# Patient Record
Sex: FEMALE
[De-identification: names, ages, dates, MRNs, and addresses within clinical notes are randomized per-mention and may not be internally consistent; named-entity substitution may affect disease eponyms.]

---

## 2021-03-09 PROBLEM — Z00.00 ENCOUNTER FOR PREVENTIVE HEALTH EXAMINATION: Status: ACTIVE | Noted: 2021-03-09

## 2021-03-11 ENCOUNTER — LABORATORY RESULT (OUTPATIENT)
Age: 46
End: 2021-03-11

## 2021-03-11 ENCOUNTER — APPOINTMENT (OUTPATIENT)
Dept: SURGERY | Facility: CLINIC | Age: 46
End: 2021-03-11
Payer: COMMERCIAL

## 2021-03-11 VITALS
SYSTOLIC BLOOD PRESSURE: 155 MMHG | BODY MASS INDEX: 39.26 KG/M2 | HEIGHT: 57 IN | WEIGHT: 182 LBS | DIASTOLIC BLOOD PRESSURE: 96 MMHG | HEART RATE: 94 BPM

## 2021-03-11 DIAGNOSIS — R03.0 ELEVATED BLOOD-PRESSURE READING, W/OUT DIAGNOSIS OF HYPERTENSION: ICD-10-CM

## 2021-03-11 DIAGNOSIS — Z87.891 PERSONAL HISTORY OF NICOTINE DEPENDENCE: ICD-10-CM

## 2021-03-11 PROCEDURE — 99072 ADDL SUPL MATRL&STAF TM PHE: CPT

## 2021-03-11 PROCEDURE — 99203 OFFICE O/P NEW LOW 30 MIN: CPT | Mod: 25

## 2021-03-11 PROCEDURE — 10005 FNA BX W/US GDN 1ST LES: CPT

## 2021-03-11 RX ORDER — GLUC/MSM/COLGN2/HYAL/ANTIARTH3 375-375-20
TABLET ORAL
Refills: 0 | Status: ACTIVE | COMMUNITY

## 2021-03-13 ENCOUNTER — TRANSCRIPTION ENCOUNTER (OUTPATIENT)
Age: 46
End: 2021-03-13

## 2021-03-13 NOTE — ASSESSMENT
[FreeTextEntry1] : Patient with multinodular goiter with large, left nodule with uptake on PET scan. Ultrasound-guided fine-needle aspiration of the left nodule performed. Please see separate procedure note. If benign cytology, follow up ultrasound August 2021, RTO 6 months.

## 2021-03-13 NOTE — HISTORY OF PRESENT ILLNESS
[de-identified] : Patient referred by Dr. Maya for evaluation of multinodular goiter.  Patient is followed by a hematologist for an elevated white count and protein. PET scan February 2021: Left thyroid nodule 4.5 CM, SUV 10.4, right nodule 3.6, SUV 2.2.  Thyroid ultrasound, right lobe 4.8 x 1.7 x 1.7 CM with a 4 mm coarse calcification. Left lobe 5 x 3 x 2.9 CM, with 4 x 2.7 x 2.5 CM nodule with microcalcifications. Left level II lymph node measuring 1.3 x 0.7 x 0.5 CM.  , TSH 1.7, free T4 1.1.  Patient denies prior history of thyroid disease, dysphagia, change in voice, shortness of breath, or radiation exposure.  I have reviewed all old and new data and available images.  Additional information was obtained from others present at the time of the visit to ensure the completeness of the history.\par

## 2021-03-13 NOTE — PROCEDURE
[FreeTextEntry1] : US guided FNA [FreeTextEntry2] : left nodule 4.5 cm with increased SUV on PET scan. [FreeTextEntry3] : Patient for thyroid biopsy. Risks benefits and alternatives discussed including bleeding, infection, swelling and need for repeat biopsy. Questions answered.\par Consent obtained, timeout taken.\par \par Patient supine.\par No anesthetic used. \par Nodule localized with US guidance\par Sterile technique with Betadine skin prep.\par 22-gauge needle under ultrasound guidance with a single pass.\par Slides prepared sent to histology.\par \par Post procedure:\par Hemostasis obtained, patient tolerated well, no complications.\par Post procedure instructions given.\par \par

## 2021-03-13 NOTE — CONSULT LETTER
[Dear  ___] : Dear  [unfilled], [Consult Letter:] : I had the pleasure of evaluating your patient, [unfilled]. [Please see my note below.] : Please see my note below. [Consult Closing:] : Thank you very much for allowing me to participate in the care of this patient.  If you have any questions, please do not hesitate to contact me. [DrChristine  ___] : Dr. MOSES [FreeTextEntry2] : Dr. Malika Maya [FreeTextEntry3] : Sincerely yours,\par \par Meghan Mast MD, FACS\par Assistant Professor of Surgery and Otolaryngology\par [facility]

## 2021-03-13 NOTE — PHYSICAL EXAM
[Normal] : no neck adenopathy [de-identified] : short, thick neck , no cervical or supraclavicular adenopathy, trachea midline, thyroid without enlargement, with left nodule 4 cm extending behind clavicle.  [de-identified] : Skin:  normal appearance.  no rash, nodules, vesicles, or erythema,\par Musculoskeletal:  full range of motion and no deformities appreciated\par Neurological:  grossly intact\par Psychiatric:  oriented to person, place and time with appropriate affect

## 2021-03-13 NOTE — PHYSICAL EXAM
[Normal] : no neck adenopathy [de-identified] : short, thick neck , no cervical or supraclavicular adenopathy, trachea midline, thyroid without enlargement, with left nodule 4 cm extending behind clavicle.  [de-identified] : Skin:  normal appearance.  no rash, nodules, vesicles, or erythema,\par Musculoskeletal:  full range of motion and no deformities appreciated\par Neurological:  grossly intact\par Psychiatric:  oriented to person, place and time with appropriate affect

## 2021-03-13 NOTE — CONSULT LETTER
[Dear  ___] : Dear  [unfilled], [Consult Letter:] : I had the pleasure of evaluating your patient, [unfilled]. [Please see my note below.] : Please see my note below. [Consult Closing:] : Thank you very much for allowing me to participate in the care of this patient.  If you have any questions, please do not hesitate to contact me. [DrChristine  ___] : Dr. MOSES [FreeTextEntry2] : Dr. Malika Maya [FreeTextEntry3] : Sincerely yours,\par \par Mehgan Mast MD, FACS\par Assistant Professor of Surgery and Otolaryngology\par [facility]

## 2021-03-13 NOTE — HISTORY OF PRESENT ILLNESS
[de-identified] : Patient referred by Dr. Maya for evaluation of multinodular goiter.  Patient is followed by a hematologist for an elevated white count and protein. PET scan February 2021: Left thyroid nodule 4.5 CM, SUV 10.4, right nodule 3.6, SUV 2.2.  Thyroid ultrasound, right lobe 4.8 x 1.7 x 1.7 CM with a 4 mm coarse calcification. Left lobe 5 x 3 x 2.9 CM, with 4 x 2.7 x 2.5 CM nodule with microcalcifications. Left level II lymph node measuring 1.3 x 0.7 x 0.5 CM.  , TSH 1.7, free T4 1.1.  Patient denies prior history of thyroid disease, dysphagia, change in voice, shortness of breath, or radiation exposure.  I have reviewed all old and new data and available images.  Additional information was obtained from others present at the time of the visit to ensure the completeness of the history.\par

## 2021-03-13 NOTE — REASON FOR VISIT
[Initial Consultation] : an initial consultation for [Procedure: _________] : a [unfilled] procedure visit [Friend] : friend [FreeTextEntry2] : multinodular goiter

## 2021-03-15 ENCOUNTER — NON-APPOINTMENT (OUTPATIENT)
Age: 46
End: 2021-03-15

## 2021-03-25 ENCOUNTER — NON-APPOINTMENT (OUTPATIENT)
Age: 46
End: 2021-03-25

## 2021-04-14 ENCOUNTER — OUTPATIENT (OUTPATIENT)
Dept: OUTPATIENT SERVICES | Facility: HOSPITAL | Age: 46
LOS: 1 days | End: 2021-04-14

## 2021-04-14 VITALS
WEIGHT: 177.91 LBS | HEART RATE: 98 BPM | SYSTOLIC BLOOD PRESSURE: 138 MMHG | RESPIRATION RATE: 16 BRPM | OXYGEN SATURATION: 98 % | HEIGHT: 57 IN | TEMPERATURE: 98 F | DIASTOLIC BLOOD PRESSURE: 80 MMHG

## 2021-04-14 DIAGNOSIS — Z01.812 ENCOUNTER FOR PREPROCEDURAL LABORATORY EXAMINATION: ICD-10-CM

## 2021-04-14 DIAGNOSIS — E04.1 NONTOXIC SINGLE THYROID NODULE: ICD-10-CM

## 2021-04-14 DIAGNOSIS — Z98.890 OTHER SPECIFIED POSTPROCEDURAL STATES: Chronic | ICD-10-CM

## 2021-04-14 LAB
ANION GAP SERPL CALC-SCNC: 11 MMOL/L — SIGNIFICANT CHANGE UP (ref 7–14)
BUN SERPL-MCNC: 6 MG/DL — LOW (ref 7–23)
CALCIUM SERPL-MCNC: 10.1 MG/DL — SIGNIFICANT CHANGE UP (ref 8.4–10.5)
CHLORIDE SERPL-SCNC: 102 MMOL/L — SIGNIFICANT CHANGE UP (ref 98–107)
CO2 SERPL-SCNC: 26 MMOL/L — SIGNIFICANT CHANGE UP (ref 22–31)
CREAT SERPL-MCNC: 0.64 MG/DL — SIGNIFICANT CHANGE UP (ref 0.5–1.3)
GLUCOSE SERPL-MCNC: 112 MG/DL — HIGH (ref 70–99)
HCG UR QL: NEGATIVE — SIGNIFICANT CHANGE UP
HCT VFR BLD CALC: 39.8 % — SIGNIFICANT CHANGE UP (ref 34.5–45)
HGB BLD-MCNC: 13.4 G/DL — SIGNIFICANT CHANGE UP (ref 11.5–15.5)
MCHC RBC-ENTMCNC: 30.1 PG — SIGNIFICANT CHANGE UP (ref 27–34)
MCHC RBC-ENTMCNC: 33.7 GM/DL — SIGNIFICANT CHANGE UP (ref 32–36)
MCV RBC AUTO: 89.4 FL — SIGNIFICANT CHANGE UP (ref 80–100)
NRBC # BLD: 0 /100 WBCS — SIGNIFICANT CHANGE UP
NRBC # FLD: 0 K/UL — SIGNIFICANT CHANGE UP
PLATELET # BLD AUTO: 297 K/UL — SIGNIFICANT CHANGE UP (ref 150–400)
POTASSIUM SERPL-MCNC: 3.9 MMOL/L — SIGNIFICANT CHANGE UP (ref 3.5–5.3)
POTASSIUM SERPL-SCNC: 3.9 MMOL/L — SIGNIFICANT CHANGE UP (ref 3.5–5.3)
RBC # BLD: 4.45 M/UL — SIGNIFICANT CHANGE UP (ref 3.8–5.2)
RBC # FLD: 12.5 % — SIGNIFICANT CHANGE UP (ref 10.3–14.5)
SODIUM SERPL-SCNC: 139 MMOL/L — SIGNIFICANT CHANGE UP (ref 135–145)
WBC # BLD: 7.38 K/UL — SIGNIFICANT CHANGE UP (ref 3.8–10.5)
WBC # FLD AUTO: 7.38 K/UL — SIGNIFICANT CHANGE UP (ref 3.8–10.5)

## 2021-04-14 RX ORDER — SODIUM CHLORIDE 9 MG/ML
1000 INJECTION, SOLUTION INTRAVENOUS
Refills: 0 | Status: DISCONTINUED | OUTPATIENT
Start: 2021-04-21 | End: 2021-05-05

## 2021-04-14 NOTE — H&P PST ADULT - HISTORY OF PRESENT ILLNESS
46 yo female presents to [facility] for preop evaluation for left thyroid lobectomy with closure possible total.  Patient reports seen by Hematologist and noted to have thyroid nodule on left side.  Patient had diagnostic imaging and diagnosed with nontoxic single thyroid nodule.  Patient denies any pain or difficulty swallowing.

## 2021-04-14 NOTE — H&P PST ADULT - OTHER CARE PROVIDERS
Dr. Maya Endocrinologist [phone], Dr. Costello [facility], Dr. Maya Endocrinologist [phone], Dr. Costello Hematologist [phone]

## 2021-04-14 NOTE — H&P PST ADULT - NSICDXFAMILYHX_GEN_ALL_CORE_FT
FAMILY HISTORY:  Father  Still living? Unknown  FHx: heart disease, Age at diagnosis: Age Unknown

## 2021-04-14 NOTE — H&P PST ADULT - NSANTHOSAYNRD_GEN_A_CORE
No. PARISA screening performed.  STOP BANG Legend: 0-2 = LOW Risk; 3-4 = INTERMEDIATE Risk; 5-8 = HIGH Risk

## 2021-04-14 NOTE — H&P PST ADULT - NSICDXPROBLEM_GEN_ALL_CORE_FT
PROBLEM DIAGNOSES  Problem: Nontoxic single thyroid nodule  Assessment and Plan: Patient scheduled for left thyroid lobectomy with closure possible total on 4/21/2021  Written & verbal preop instructions, gi prophylaxis & surgical soap given  Pt verbalized good understanding.  Teach back done on surgical soap instructions.     Problem: Encounter for preprocedure screening laboratory testing for COVID-19  Assessment and Plan: Patient aware of need for COVID testing prior to procedure and advised to co ordinate with surgeon.

## 2021-04-14 NOTE — H&P PST ADULT - NEGATIVE OPHTHALMOLOGIC SYMPTOMS
no diplopia/no photophobia/no blurred vision L/no blurred vision R/no pain L/no pain R/no irritation L/no irritation R

## 2021-04-17 DIAGNOSIS — Z01.818 ENCOUNTER FOR OTHER PREPROCEDURAL EXAMINATION: ICD-10-CM

## 2021-04-18 ENCOUNTER — APPOINTMENT (OUTPATIENT)
Dept: DISASTER EMERGENCY | Facility: CLINIC | Age: 46
End: 2021-04-18

## 2021-04-18 LAB — SARS-COV-2 N GENE NPH QL NAA+PROBE: NOT DETECTED

## 2021-04-20 ENCOUNTER — TRANSCRIPTION ENCOUNTER (OUTPATIENT)
Age: 46
End: 2021-04-20

## 2021-04-20 NOTE — ASU PATIENT PROFILE, ADULT - PMH
H/O ganglion cyst  left hand  Hyperproteinemia    Iron deficiency anemia    Nontoxic single thyroid nodule    Obesity

## 2021-04-21 ENCOUNTER — OUTPATIENT (OUTPATIENT)
Dept: OUTPATIENT SERVICES | Facility: HOSPITAL | Age: 46
LOS: 1 days | Discharge: HOME | End: 2021-04-21
Payer: COMMERCIAL

## 2021-04-21 ENCOUNTER — APPOINTMENT (OUTPATIENT)
Dept: SURGERY | Facility: HOSPITAL | Age: 46
End: 2021-04-21

## 2021-04-21 ENCOUNTER — RESULT REVIEW (OUTPATIENT)
Age: 46
End: 2021-04-21

## 2021-04-21 VITALS
DIASTOLIC BLOOD PRESSURE: 68 MMHG | HEART RATE: 88 BPM | OXYGEN SATURATION: 98 % | RESPIRATION RATE: 20 BRPM | SYSTOLIC BLOOD PRESSURE: 124 MMHG | TEMPERATURE: 98 F

## 2021-04-21 VITALS
HEART RATE: 89 BPM | TEMPERATURE: 98 F | WEIGHT: 177.91 LBS | OXYGEN SATURATION: 99 % | HEIGHT: 57 IN | DIASTOLIC BLOOD PRESSURE: 79 MMHG | SYSTOLIC BLOOD PRESSURE: 148 MMHG | RESPIRATION RATE: 16 BRPM

## 2021-04-21 DIAGNOSIS — Z98.890 OTHER SPECIFIED POSTPROCEDURAL STATES: Chronic | ICD-10-CM

## 2021-04-21 DIAGNOSIS — E04.1 NONTOXIC SINGLE THYROID NODULE: ICD-10-CM

## 2021-04-21 LAB — HCG UR QL: NEGATIVE — SIGNIFICANT CHANGE UP

## 2021-04-21 PROCEDURE — 88305 TISSUE EXAM BY PATHOLOGIST: CPT | Mod: 26

## 2021-04-21 PROCEDURE — 60271 REMOVAL OF THYROID: CPT | Mod: 59

## 2021-04-21 PROCEDURE — 60500 EXPLORE PARATHYROID GLANDS: CPT

## 2021-04-21 PROCEDURE — 88307 TISSUE EXAM BY PATHOLOGIST: CPT | Mod: 26

## 2021-04-21 PROCEDURE — 88331 PATH CONSLTJ SURG 1 BLK 1SPC: CPT | Mod: 26

## 2021-04-21 PROCEDURE — 13132 CMPLX RPR F/C/C/M/N/AX/G/H/F: CPT | Mod: 59

## 2021-04-21 RX ORDER — LORATADINE 10 MG/1
1 TABLET ORAL
Qty: 0 | Refills: 0 | DISCHARGE

## 2021-04-21 RX ORDER — FENTANYL CITRATE 50 UG/ML
50 INJECTION INTRAVENOUS
Refills: 0 | Status: DISCONTINUED | OUTPATIENT
Start: 2021-04-21 | End: 2021-04-21

## 2021-04-21 RX ORDER — BENZOCAINE AND MENTHOL 5; 1 G/100ML; G/100ML
1 LIQUID ORAL
Refills: 0 | Status: DISCONTINUED | OUTPATIENT
Start: 2021-04-21 | End: 2021-05-05

## 2021-04-21 RX ORDER — ACETAMINOPHEN 500 MG
650 TABLET ORAL EVERY 6 HOURS
Refills: 0 | Status: DISCONTINUED | OUTPATIENT
Start: 2021-04-21 | End: 2021-05-05

## 2021-04-21 RX ORDER — FENTANYL CITRATE 50 UG/ML
25 INJECTION INTRAVENOUS
Refills: 0 | Status: DISCONTINUED | OUTPATIENT
Start: 2021-04-21 | End: 2021-04-21

## 2021-04-21 RX ORDER — ONDANSETRON 8 MG/1
4 TABLET, FILM COATED ORAL ONCE
Refills: 0 | Status: DISCONTINUED | OUTPATIENT
Start: 2021-04-21 | End: 2021-05-05

## 2021-04-21 RX ADMIN — BENZOCAINE AND MENTHOL 1 LOZENGE: 5; 1 LIQUID ORAL at 15:15

## 2021-04-21 RX ADMIN — SODIUM CHLORIDE 30 MILLILITER(S): 9 INJECTION, SOLUTION INTRAVENOUS at 15:20

## 2021-04-21 RX ADMIN — Medication 2 TABLET(S): at 14:43

## 2021-04-21 NOTE — ASU DISCHARGE PLAN (ADULT/PEDIATRIC) - ASU DC SPECIAL INSTRUCTIONSFT
Keep dressings dry for 48 hours, then may shower with steri strips.   Wear supportive bra for 2 weeks, day and night to reduce tension on incision.  TAKE OTC CALCIUM (500mg or 600mg) + Vit D as directed, 2 tabs every 8 hours (3 times a day).  Avoid NSAIDs (Advil, Motrin, Ibuprofen) for 48 hours.   Take OTC Tylenol 325mg, 2 tabs every 6 hours as needed for pain.   May take OTC Lozenges (Cepacol) 1 tab every 4 hours for sore throat as needed.  Ice pack to neck for comfort as needed.   *Follow up with Dr. Mast within 1 week (4/29/2021), please call the office for an appointment. Keep dressings dry for 48 hours, then may shower with steri strips.   Wear supportive bra for 2 weeks, day and night to reduce tension on incision.  TAKE OTC CALCIUM (500mg or 600mg) + Vit D as directed, 2 tabs every 8 hours (3 times a day).  Avoid NSAIDs (Advil, Motrin, Ibuprofen) for 48 hours.   Take OTC Tylenol 325mg, 2 tabs every 6 hours as needed for pain.   May take OTC Lozenges (Cepacol) 1 tab every 4 hours for sore throat as needed.  Ice pack to neck for comfort as needed.   *Follow up with Dr. Mast within 1 week (4/27/2021), please call the office for an appointment.

## 2021-04-21 NOTE — ASU DISCHARGE PLAN (ADULT/PEDIATRIC) - CARE PROVIDER_API CALL
Meghan Mast (MD)  Surgery  [address], [address]  [place] [address]  Phone: [phone]  Fax: [phone]  Scheduled Appointment: 04/29/2021   Meghan Mast (MD)  Surgery  [address], [address]  [place] [address]  Phone: [phone]  Fax: [phone]  Scheduled Appointment: 04/27/2021

## 2021-04-21 NOTE — ASU DISCHARGE PLAN (ADULT/PEDIATRIC) - NURSING INSTRUCTIONS
You received IV Tylenol for pain management at 11:30 AM. Please DO NOT take any Tylenol (Acetaminophen) containing products, such as Vicodin, Percocet, Excedrin, and cold medications for the next 6 hours (until 5:30 PM). DO NOT TAKE MORE THAN 3000 MG OF TYLENOL in a 24 hour period.

## 2021-04-21 NOTE — ASU DISCHARGE PLAN (ADULT/PEDIATRIC) - PROVIDER TOKENS
PROVIDER:[TOKEN:[[id number]],SCHEDULEDAPPT:[04/29/2021]] PROVIDER:[TOKEN:[[id number]],SCHEDULEDAPPT:[04/27/2021]]

## 2021-04-22 PROBLEM — D50.9 IRON DEFICIENCY ANEMIA, UNSPECIFIED: Chronic | Status: ACTIVE | Noted: 2021-04-14

## 2021-04-22 PROBLEM — E88.09 OTHER DISORDERS OF PLASMA-PROTEIN METABOLISM, NOT ELSEWHERE CLASSIFIED: Chronic | Status: ACTIVE | Noted: 2021-04-14

## 2021-04-22 PROBLEM — E04.1 NONTOXIC SINGLE THYROID NODULE: Chronic | Status: ACTIVE | Noted: 2021-04-14

## 2021-04-22 PROBLEM — E66.9 OBESITY, UNSPECIFIED: Chronic | Status: ACTIVE | Noted: 2021-04-14

## 2021-04-22 PROBLEM — Z87.39 PERSONAL HISTORY OF OTHER DISEASES OF THE MUSCULOSKELETAL SYSTEM AND CONNECTIVE TISSUE: Chronic | Status: ACTIVE | Noted: 2021-04-14

## 2021-04-27 ENCOUNTER — APPOINTMENT (OUTPATIENT)
Dept: SURGERY | Facility: CLINIC | Age: 46
End: 2021-04-27
Payer: COMMERCIAL

## 2021-04-27 PROCEDURE — 99024 POSTOP FOLLOW-UP VISIT: CPT

## 2021-04-27 RX ORDER — CIPROFLOXACIN HYDROCHLORIDE 500 MG/1
500 TABLET, FILM COATED ORAL
Qty: 20 | Refills: 0 | Status: ACTIVE | COMMUNITY
Start: 2021-04-27 | End: 1900-01-01

## 2021-04-27 NOTE — PHYSICAL EXAM
[de-identified] : incision healign with mild redness, no warmth, mild swelling no fluctuance.  scar min discussed.  short, thick neck , no cervical or supraclavicular adenopathy, trachea midline, thyroid without enlargement, with left nodule 4 cm extending behind clavicle.  [Normal] : no neck adenopathy [de-identified] : Skin:  normal appearance.  no rash, nodules, vesicles, or erythema,\par Musculoskeletal:  full range of motion and no deformities appreciated\par Neurological:  grossly intact\par Psychiatric:  oriented to person, place and time with appropriate affect

## 2021-04-27 NOTE — ASSESSMENT
[FreeTextEntry1] : Patient with multinodular goiter with large, left nodule with uptake on PET scan. Ultrasound-guided fine-needle aspiration of the left nodule suspicious.  doing well postop.  will do course abx ,  see Dr dillon early june, RTO 4 mo

## 2021-04-27 NOTE — HISTORY OF PRESENT ILLNESS
[de-identified] : Patient referred by Dr. Maya for evaluation of multinodular goiter.  Patient is followed by a hematologist for an elevated white count and protein. PET scan February 2021: Left thyroid nodule 4.5 CM, SUV 10.4, right nodule 3.6, SUV 2.2.  Thyroid ultrasound, right lobe 4.8 x 1.7 x 1.7 CM with a 4 mm coarse calcification. Left lobe 5 x 3 x 2.9 CM, with 4 x 2.7 x 2.5 CM nodule with microcalcifications. Left level II lymph node measuring 1.3 x 0.7 x 0.5 CM.  , TSH 1.7, free T4 1.1.  Patient denies prior history of thyroid disease, dysphagia, change in voice, shortness of breath, or radiation exposure.\par 4/21/21 left thyroid lobectomy with resection substernal goiter. denies pain or change in voice.  path pending.    I have reviewed all old and new data and available images.  Additional information was obtained from others present at the time of the visit to ensure the completeness of the history.\par

## 2021-04-29 LAB — SURGICAL PATHOLOGY STUDY: SIGNIFICANT CHANGE UP

## 2021-04-30 ENCOUNTER — NON-APPOINTMENT (OUTPATIENT)
Age: 46
End: 2021-04-30

## 2021-08-31 ENCOUNTER — APPOINTMENT (OUTPATIENT)
Dept: SURGERY | Facility: CLINIC | Age: 46
End: 2021-08-31
Payer: COMMERCIAL

## 2021-08-31 DIAGNOSIS — E04.2 NONTOXIC MULTINODULAR GOITER: ICD-10-CM

## 2021-08-31 DIAGNOSIS — E04.9 NONTOXIC GOITER, UNSPECIFIED: ICD-10-CM

## 2021-08-31 PROCEDURE — 99213 OFFICE O/P EST LOW 20 MIN: CPT

## 2021-08-31 NOTE — PHYSICAL EXAM
[de-identified] : incision healign well.  scar min discussed.  short, thick neck , no cervical or supraclavicular adenopathy, trachea midline, thyroid without enlargement, with left nodule 4 cm extending behind clavicle.  [Normal] : no neck adenopathy [de-identified] : Skin:  normal appearance.  no rash, nodules, vesicles, or erythema,\par Musculoskeletal:  full range of motion and no deformities appreciated\par Neurological:  grossly intact\par Psychiatric:  oriented to person, place and time with appropriate affect

## 2021-08-31 NOTE — HISTORY OF PRESENT ILLNESS
[de-identified] : Patient referred by Dr. Maya for evaluation of multinodular goiter.  Patient is followed by a hematologist for an elevated white count and protein. PET scan February 2021: Left thyroid nodule 4.5 CM, SUV 10.4, right nodule 3.6, SUV 2.2.  Thyroid ultrasound, right lobe 4.8 x 1.7 x 1.7 CM with a 4 mm coarse calcification. Left lobe 5 x 3 x 2.9 CM, with 4 x 2.7 x 2.5 CM nodule with microcalcifications. Left level II lymph node measuring 1.3 x 0.7 x 0.5 CM.  , TSH 1.7, free T4 1.1.  Patient denies prior history of thyroid disease, dysphagia, change in voice, shortness of breath, or radiation exposure.\par 4/21/21 left thyroid lobectomy with resection substernal goiter. denies pain or change in voice.  path Hurthle cell adenoma.  blood work normal, not on medications.     I have reviewed all old and new data and available images.

## 2021-08-31 NOTE — ASSESSMENT
[FreeTextEntry1] : Patient with multinodular goiter with large, left nodule benign on final path. will continue under care of  Dr wilma morgna as needed.  I have answered their questions to the best of my ability.\par  Parent(s)

## 2021-09-23 ENCOUNTER — APPOINTMENT (OUTPATIENT)
Dept: SURGERY | Facility: CLINIC | Age: 46
End: 2021-09-23

## 2022-05-27 ENCOUNTER — APPOINTMENT (OUTPATIENT)
Dept: ORTHOPEDIC SURGERY | Facility: CLINIC | Age: 47
End: 2022-05-27
Payer: COMMERCIAL

## 2022-05-27 VITALS — HEIGHT: 57 IN | WEIGHT: 182 LBS | BODY MASS INDEX: 39.26 KG/M2

## 2022-05-27 DIAGNOSIS — M70.22 OLECRANON BURSITIS, LEFT ELBOW: ICD-10-CM

## 2022-05-27 DIAGNOSIS — M79.18 MYALGIA, OTHER SITE: ICD-10-CM

## 2022-05-27 PROCEDURE — 99204 OFFICE O/P NEW MOD 45 MIN: CPT

## 2022-05-27 NOTE — DISCUSSION/SUMMARY
[de-identified] : Likely resolved olecranon bursitis of left elbow. Currently asymptomatic.\par RICE, nsaids prn, activity modification.\par Fu as needed.\par \par Home Exercise\par The patient is instructed on a home exercise program.\par \par Activity Modification\par The patient was advised to modify their activities.\par \par Dx / Natural History\par The patient was advised of the diagnosis. The natural history of the pathology was explained in full to the patient in layman's terms. Several different treatment options were discussed and explained in full to the patient including the risks and benefits of both surgical and non-surgical treatments. All questions and concerns were answered.\par \par Pain Guide Activities\par The patient was advised to let pain guide the gradual advancement of activities.\par \par RICE \par I explained to the patient that rest, ice, compression, and elevation would benefit them.  They may return to activity after follow-up or when they no longer have any pain.\par \par \par Stalin Lacey PA-C Acting as a Scribe for Dr. Covington.\par

## 2022-05-27 NOTE — PHYSICAL EXAM
[Normal Coordination] : normal coordination [Orientated] : orientated [Normal Skin] : normal skin [No obvious lymphadenopathy in areas examined] : no obvious lymphadenopathy in areas examined [Well Developed] : well developed [Peripheral vascular exam is grossly normal] : peripheral vascular exam is grossly normal [No Respiratory Distress] : no respiratory distress [Left] : left elbow [NL (150)] : flexion 150 degrees [NL (0)] : extension 0 degrees [NL (90)] : supination 90 degrees [5___] : supination 5[unfilled]/5 [] : no tenderness over olecranon [de-identified] : ligamentously stable

## 2022-05-27 NOTE — HISTORY OF PRESENT ILLNESS
[de-identified] : The patient is a 46 year old right hand dominant female who presents today complaining of left elbow.  Patient admits to a month of left elbow pain which resolved on it's own a few days ago. Denies injury or trauma. Experienced the pain when she would lean on her elbow. Tender to touch at tip of the elbow. No redness or swelling. Admits to having full rom. Denies n/t. \par Date of Injury/Onset: n/a\par Pain:    At Rest: 0/10 \par With Activity:  6/10 \par Mechanism of injury: no specific injury\par This is not a Work Related Injury being treated under Worker's Compensation.\par This is not an athletic injury occurring associated with an interscholastic or organized sports team.\par Quality of symptoms: sharp\par Improves with: rest\par Worse with: pressure\par Prior treatment: city [facility] \par Prior Imaging: xrays\par Out of work/sport: _, since _\par School/Sport/Position/Occupation: working, fulltime,  \par Additional Information: None\par

## 2023-10-12 NOTE — ASU PREOP CHECKLIST - PATIENT'S PERSONAL PROPERTY GIVEN TO
V2.0  Discharge Summary    Name:  Rafy Black /Age/Sex: 1946 (69 y.o. female)   Admit Date: 10/10/2023  Discharge Date: 10/12/23    MRN & CSN:  [id number] & [id number] Encounter Date and Time 10/12/23 1:05 PM EDT    Attending:  Joyce Simmons MD Discharging Provider: Joyce Simmons MD       Discharge Diagnosess:     R bimalleolar fracture requiring ORIF  Acute metabolic encephalopathy  Chronic combined CHF  CAD      Admission HPI, hospital course, and consultants:     Admission HPI:  \"66 y.o. female who [facility] to [facility] with fall. PMHx significant for DM2 HTN CAD on Plavix. Presents with mechanical fall, states her dog tripped her, did hit head no LOC. As she fell twisted R ankle, instant pain, difficult to ambulate. Denies chest pain/pressure, abd pain, head ache, dizziness, numbness tingling     In ER  Did not obtain head ct     XR R ankle showed Posterior dislocation of the talus in respect to the tibial plafond./Displaced bimalleolar fractures. XR R Tib/fib showed Posterior dislocation of the talus in respect to the tibial plafond./Displaced bimalleolar fractures. Cr 1.5  Glucose 198\"    Hospital course:  Pateint had uncomplicated ORIF to R ankle. She did have some post-op delirium but this resolved by AM. Final discharge disposition to be guided by PT evalauation. The patient expressed appropriate understanding of, and agreement with the discharge recommendations, medications, and plan. Consults this admission:  IP CONSULT TO ORTHOPEDIC SURGERY  IP CONSULT TO CARDIOLOGY    Discharge Instructions:    Follow up appointments: orthopedic surgery  Primary care physician: Morteza Del Real within 2 weeks  Diet: regular diet   Activity: NWB RLE 6 weeks  Disposition: Discharged to:   []Home, []HHC, []SNF, []Acute Rehab, []Hospice   Condition on discharge: Stable  Labs and Tests to be Followed up as an outpatient by PCP or Specialist: none    Discharge Medications: wss locker 6/on unit

## 2023-10-24 ENCOUNTER — OFFICE (OUTPATIENT)
Dept: URBAN - METROPOLITAN AREA CLINIC 62 | Facility: CLINIC | Age: 48
Setting detail: OPHTHALMOLOGY
End: 2023-10-24
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
## 2023-10-24 PROCEDURE — NO SHOW FE NO SHOW FEE: Performed by: OPHTHALMOLOGY

## 2024-05-11 NOTE — ASU PATIENT PROFILE, ADULT - NSSUBSTANCEUSE_GEN_ALL_CORE_SD
Patient reports that he vomited 1x prior to this RN walking into the room. ED MD notified.    caffeine